# Patient Record
Sex: MALE | Race: OTHER | URBAN - METROPOLITAN AREA
[De-identification: names, ages, dates, MRNs, and addresses within clinical notes are randomized per-mention and may not be internally consistent; named-entity substitution may affect disease eponyms.]

---

## 2023-06-08 ENCOUNTER — EMERGENCY (EMERGENCY)
Facility: HOSPITAL | Age: 28
LOS: 1 days | Discharge: HOME | End: 2023-06-08
Admitting: EMERGENCY MEDICINE
Payer: COMMERCIAL

## 2023-06-08 VITALS
RESPIRATION RATE: 18 BRPM | SYSTOLIC BLOOD PRESSURE: 128 MMHG | OXYGEN SATURATION: 100 % | TEMPERATURE: 98 F | HEART RATE: 101 BPM | DIASTOLIC BLOOD PRESSURE: 78 MMHG

## 2023-06-08 PROCEDURE — 99284 EMERGENCY DEPT VISIT MOD MDM: CPT

## 2023-06-08 PROCEDURE — 73630 X-RAY EXAM OF FOOT: CPT

## 2023-06-08 PROCEDURE — 73630 X-RAY EXAM OF FOOT: CPT | Mod: 26,LT

## 2023-06-08 PROCEDURE — 99283 EMERGENCY DEPT VISIT LOW MDM: CPT | Mod: 25

## 2023-06-08 RX ORDER — IBUPROFEN 200 MG
600 TABLET ORAL ONCE
Refills: 0 | Status: COMPLETED | OUTPATIENT
Start: 2023-06-08 | End: 2023-06-08

## 2023-06-08 RX ADMIN — Medication 600 MILLIGRAM(S): at 12:38
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
## 2023-06-08 NOTE — ED ADULT NURSE NOTE - NSFALLUNIVINTERV_ED_ALL_ED
Bed/Stretcher in lowest position, wheels locked, appropriate side rails in place/Call bell, personal items and telephone in reach/Instruct patient to call for assistance before getting out of bed/chair/stretcher/Non-slip footwear applied when patient is off stretcher/[place] to call system/Physically safe environment - no spills, clutter or unnecessary equipment/Purposeful proactive rounding/Room/bathroom lighting operational, light cord in reach
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
## 2023-06-08 NOTE — ED PROVIDER NOTE - PATIENT PORTAL LINK FT
You can access the FollowMyHealth Patient Portal offered by [facility] by registering at the following website: [email or web address]. By joining [facility]’s FollowMyHealth portal, you will also be able to view your health information using other applications (apps) compatible with our system.
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
## 2023-06-10 DIAGNOSIS — M79.645 PAIN IN LEFT FINGER(S): ICD-10-CM

## 2023-06-10 DIAGNOSIS — S90.112A CONTUSION OF LEFT GREAT TOE WITHOUT DAMAGE TO NAIL, INITIAL ENCOUNTER: ICD-10-CM

## 2023-06-10 DIAGNOSIS — Z88.0 ALLERGY STATUS TO PENICILLIN: ICD-10-CM

## 2023-06-10 DIAGNOSIS — S00.81XA ABRASION OF OTHER PART OF HEAD, INITIAL ENCOUNTER: ICD-10-CM

## 2023-06-10 DIAGNOSIS — S70.212A ABRASION, LEFT HIP, INITIAL ENCOUNTER: ICD-10-CM
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
